# Patient Record
(demographics unavailable — no encounter records)

---

## 2024-12-16 NOTE — PLAN
[FreeTextEntry1] :   #HLD -Low cholesterol diet -Lipid panel -Continue statin  #HTN -Continue losartan 100 mg QD  -CMP, TSH w. rFT4, CBC w. diff   #Abdominal distention -CT abdomen and pelvis 02/2024- fatty liver , mild colonic diverticulosis, small right unchanged inguinal hernia containing fat and knuckle of bladder wall, unchanged  Labs drawn in office  F/u for CPE

## 2024-12-16 NOTE — HISTORY OF PRESENT ILLNESS
[FreeTextEntry1] : f/u HTN [de-identified] : 76 y/o female presents today for follow up of HTN and HLD. Pt denies any acute complaints.

## 2024-12-16 NOTE — PHYSICAL EXAM
[No Acute Distress] : no acute distress [Well Developed] : well developed [Well-Appearing] : well-appearing [Normal Sclera/Conjunctiva] : normal sclera/conjunctiva [EOMI] : extraocular movements intact [Supple] : supple [No Respiratory Distress] : no respiratory distress  [No Accessory Muscle Use] : no accessory muscle use [Clear to Auscultation] : lungs were clear to auscultation bilaterally [Normal Rate] : normal rate  [Regular Rhythm] : with a regular rhythm [Normal S1, S2] : normal S1 and S2 [Non Tender] : non-tender [Grossly Normal Strength/Tone] : grossly normal strength/tone [No Focal Deficits] : no focal deficits [Normal Affect] : the affect was normal [Normal Insight/Judgement] : insight and judgment were intact [de-identified] : edema [de-identified] : abdominal distension

## 2024-12-16 NOTE — PHYSICAL EXAM
[No Acute Distress] : no acute distress [Well Developed] : well developed [Well-Appearing] : well-appearing [Normal Sclera/Conjunctiva] : normal sclera/conjunctiva [EOMI] : extraocular movements intact [Supple] : supple [No Respiratory Distress] : no respiratory distress  [No Accessory Muscle Use] : no accessory muscle use [Clear to Auscultation] : lungs were clear to auscultation bilaterally [Normal Rate] : normal rate  [Regular Rhythm] : with a regular rhythm [Normal S1, S2] : normal S1 and S2 [Non Tender] : non-tender [Grossly Normal Strength/Tone] : grossly normal strength/tone [No Focal Deficits] : no focal deficits [Normal Affect] : the affect was normal [Normal Insight/Judgement] : insight and judgment were intact [de-identified] : edema [de-identified] : abdominal distension

## 2025-03-10 NOTE — HISTORY OF PRESENT ILLNESS
[FreeTextEntry1] : f/u HTN [de-identified] : 76 y/o female presents for follow up of HTN. She denies any acute complaints and states she saw cardiologist since last visit and completed imaging with contrast and results were normal.

## 2025-03-10 NOTE — PLAN
[FreeTextEntry1] :   #Elevated creatinine -CBC w. diff, CMP -urine albumin/Cr  #HTN -Continue losartan 100 mg qd  #HLD -Lipid panel -Continue simvastatin  #Obesity -A1C and TSH -Healthy diet and exercise  Labs drawn in office

## 2025-03-10 NOTE — PHYSICAL EXAM
[No Acute Distress] : no acute distress [Well Developed] : well developed [Well-Appearing] : well-appearing [Normal Sclera/Conjunctiva] : normal sclera/conjunctiva [EOMI] : extraocular movements intact [Supple] : supple [No Respiratory Distress] : no respiratory distress  [No Accessory Muscle Use] : no accessory muscle use [Clear to Auscultation] : lungs were clear to auscultation bilaterally [Normal Rate] : normal rate  [Regular Rhythm] : with a regular rhythm [Normal S1, S2] : normal S1 and S2 [Soft] : abdomen soft [Non Tender] : non-tender [Normal Bowel Sounds] : normal bowel sounds [Grossly Normal Strength/Tone] : grossly normal strength/tone [No Rash] : no rash [No Focal Deficits] : no focal deficits [Normal Affect] : the affect was normal [Normal Insight/Judgement] : insight and judgment were intact [de-identified] : 2+ edema of RLE, 1+ edema of LLE [de-identified] : abdominal distension